# Patient Record
Sex: MALE | Race: WHITE
[De-identification: names, ages, dates, MRNs, and addresses within clinical notes are randomized per-mention and may not be internally consistent; named-entity substitution may affect disease eponyms.]

---

## 2020-12-17 ENCOUNTER — HOSPITAL ENCOUNTER (EMERGENCY)
Dept: HOSPITAL 75 - ER | Age: 40
Discharge: HOME | End: 2020-12-17
Payer: SELF-PAY

## 2020-12-17 VITALS — DIASTOLIC BLOOD PRESSURE: 88 MMHG | SYSTOLIC BLOOD PRESSURE: 122 MMHG

## 2020-12-17 VITALS — HEIGHT: 66.93 IN | WEIGHT: 194.89 LBS | BODY MASS INDEX: 30.59 KG/M2

## 2020-12-17 DIAGNOSIS — R07.89: Primary | ICD-10-CM

## 2020-12-17 DIAGNOSIS — K29.60: ICD-10-CM

## 2020-12-17 DIAGNOSIS — K21.9: ICD-10-CM

## 2020-12-17 DIAGNOSIS — F17.210: ICD-10-CM

## 2020-12-17 DIAGNOSIS — F41.9: ICD-10-CM

## 2020-12-17 LAB
ALBUMIN SERPL-MCNC: 4.8 GM/DL (ref 3.2–4.5)
ALP SERPL-CCNC: 78 U/L (ref 40–136)
ALT SERPL-CCNC: 70 U/L (ref 0–55)
APTT BLD: 29 SEC (ref 24–35)
BASOPHILS # BLD AUTO: 0 10^3/UL (ref 0–0.1)
BASOPHILS NFR BLD AUTO: 0 % (ref 0–10)
BILIRUB SERPL-MCNC: 0.6 MG/DL (ref 0.1–1)
BUN/CREAT SERPL: 18
CALCIUM SERPL-MCNC: 9.3 MG/DL (ref 8.5–10.1)
CHLORIDE SERPL-SCNC: 105 MMOL/L (ref 98–107)
CO2 SERPL-SCNC: 22 MMOL/L (ref 21–32)
CREAT SERPL-MCNC: 0.99 MG/DL (ref 0.6–1.3)
EOSINOPHIL # BLD AUTO: 0.1 10^3/UL (ref 0–0.3)
EOSINOPHIL NFR BLD AUTO: 1 % (ref 0–10)
GFR SERPLBLD BASED ON 1.73 SQ M-ARVRAT: > 60 ML/MIN
GLUCOSE SERPL-MCNC: 99 MG/DL (ref 70–105)
HCT VFR BLD CALC: 46 % (ref 40–54)
HGB BLD-MCNC: 15.3 G/DL (ref 13.3–17.7)
INR PPP: 1 (ref 0.8–1.4)
LIPASE SERPL-CCNC: 33 U/L (ref 8–78)
LYMPHOCYTES # BLD AUTO: 2.2 10^3/UL (ref 1–4)
LYMPHOCYTES NFR BLD AUTO: 25 % (ref 12–44)
MAGNESIUM SERPL-MCNC: 2 MG/DL (ref 1.6–2.4)
MANUAL DIFFERENTIAL PERFORMED BLD QL: NO
MCH RBC QN AUTO: 30 PG (ref 25–34)
MCHC RBC AUTO-ENTMCNC: 34 G/DL (ref 32–36)
MCV RBC AUTO: 89 FL (ref 80–99)
MONOCYTES # BLD AUTO: 0.9 10^3/UL (ref 0–1)
MONOCYTES NFR BLD AUTO: 10 % (ref 0–12)
NEUTROPHILS # BLD AUTO: 5.6 10^3/UL (ref 1.8–7.8)
NEUTROPHILS NFR BLD AUTO: 63 % (ref 42–75)
PLATELET # BLD: 209 10^3/UL (ref 130–400)
PMV BLD AUTO: 9.5 FL (ref 9–12.2)
POTASSIUM SERPL-SCNC: 4 MMOL/L (ref 3.6–5)
PROT SERPL-MCNC: 8 GM/DL (ref 6.4–8.2)
PROTHROMBIN TIME: 13.3 SEC (ref 12.2–14.7)
SODIUM SERPL-SCNC: 137 MMOL/L (ref 135–145)
WBC # BLD AUTO: 8.8 10^3/UL (ref 4.3–11)

## 2020-12-17 PROCEDURE — 93005 ELECTROCARDIOGRAM TRACING: CPT

## 2020-12-17 PROCEDURE — 83735 ASSAY OF MAGNESIUM: CPT

## 2020-12-17 PROCEDURE — 85379 FIBRIN DEGRADATION QUANT: CPT

## 2020-12-17 PROCEDURE — 36415 COLL VENOUS BLD VENIPUNCTURE: CPT

## 2020-12-17 PROCEDURE — 83880 ASSAY OF NATRIURETIC PEPTIDE: CPT

## 2020-12-17 PROCEDURE — 83690 ASSAY OF LIPASE: CPT

## 2020-12-17 PROCEDURE — 85025 COMPLETE CBC W/AUTO DIFF WBC: CPT

## 2020-12-17 PROCEDURE — 93041 RHYTHM ECG TRACING: CPT

## 2020-12-17 PROCEDURE — 80053 COMPREHEN METABOLIC PANEL: CPT

## 2020-12-17 PROCEDURE — 83874 ASSAY OF MYOGLOBIN: CPT

## 2020-12-17 PROCEDURE — 84484 ASSAY OF TROPONIN QUANT: CPT

## 2020-12-17 PROCEDURE — 71045 X-RAY EXAM CHEST 1 VIEW: CPT

## 2020-12-17 PROCEDURE — 85610 PROTHROMBIN TIME: CPT

## 2020-12-17 PROCEDURE — 85730 THROMBOPLASTIN TIME PARTIAL: CPT

## 2020-12-17 RX ADMIN — NITROGLYCERIN PRN MG: 0.4 TABLET SUBLINGUAL at 13:55

## 2020-12-17 RX ADMIN — NITROGLYCERIN PRN MG: 0.4 TABLET SUBLINGUAL at 14:01

## 2020-12-17 NOTE — ED CHEST PAIN
General


Chief Complaint:  Chest Pain


Stated Complaint:  CP,SOB


Nursing Triage Note:  


PATIENT STATES LEFT SIDE CHEST PAIN, STARTED LAST NIGHT, WORSENED TODAY. STATES 


BACK PAIN ALSO


Nursing Sepsis Screen:  No Definite Risk


Source:  patient


Exam Limitations:  no limitations





History of Present Illness


Date Seen by Provider:  Dec 17, 2020


Time Seen by Provider:  13:34


Initial Comments


Patient presents ER by private conveyance from home with chief complaint of 

chest pain.  He denies any shortness of breath fever cough chills nausea 

vomiting diarrhea.  No sick contacts.  He says been having problems with this 

pain for couple weeks.  This particular episode started yesterday evening, he 

was just sitting in a chair working when it started.  It is not worse with 

exertion.  He says as he gets up and walks around it feels better but when he 

sits down in a chair it is worse.  He says the pain radiates through to his back

which he is never had that pain before.  In the middle of his back between his 

shoulder blades.  No trauma.  No history of coronary disease.  Last use of 

methamphetamines was about 7 months ago.  He has used tobacco has a history of 

hypertension and hyperlipidemia and recently was started on statins.  He is 

known to [facility] for primary care.  He does not have a history of 

coronary disease or lung disease.  He was on diclofenac recently but was stopped

at his last appointment a week or 2 ago because he had CKD stage II on labs.  

Patient has no significant family history outside of diabetes.  He denies 

diabetes himself.  The patient complains that the pain in his chest wall makes 

his chest and abdomen twitch rhythmically.





Allergies and Home Medications


Allergies


Coded Allergies:  


     No Known Drug Allergies (Unverified , 20)





Patient Home Medication List


Home Medication List Reviewed:  Yes





Review of Systems


Review of Systems


Constitutional:  No chills, No diaphoresis, No fever, No malaise


EENTM:  No Blurred Vision, No Double Vision


Respiratory:  Denies Cough, Denies Shortness of Air


Cardiovascular:  Chest Pain; Denies Edema, Denies Lightheadedness


Gastrointestinal:  Denies Constipated, Denies Diarrhea


Genitourinary:  Denies Burning, Denies Discharge


Musculoskeletal:  see HPI, back pain; No joint pain


Skin:  No pruritus, No rash


Psychiatric/Neurological:  Denies Headache, Denies Numbness





All Other Systems Reviewed


Negative Unless Noted:  Yes





Past Medical-Social-Family Hx


Patient Social History


Alcohol Use:  Occasionally Uses


Recreational Drug Use:  Yes


Drug of Choice:  meth 2020


Smoking Status:  Current Everyday Smoker


Type Used:  Cigarettes


Recent Foreign Travel:  No


Contact w/Someone Who Travel:  No


Recent Infectious Disease Expo:  No





Past Medical History


Surgeries:  No


Cardiac:  Yes


High Cholesterol, Hypertension


Genitourinary:  Yes


Renal Failure





Physical Exam


Vital Signs





Vital Signs - First Documented








 20





 13:41


 


Temp 37.0


 


Pulse 93


 


Resp 20


 


B/P (MAP) 144/103 (117)


 


Pulse Ox 97


 


O2 Delivery Room Air





Capillary Refill : Less Than 3 Seconds


Height, Weight, BMI


Height: '"


Weight: lbs. oz. kg; 30.00 BMI


Method:


General Appearance:  Anxious, Mild Distress


HEENT:  PERRL/EOMI, Moist Mucous Membranes


Neck:  Full Range of Motion, Normal Inspection


Respiratory:  No Chest Non Tender (Chest wall tender to palpation); Lungs Clear,

Normal Breath Sounds, No Accessory Muscle Use, No Respiratory Distress


Cardiovascular:  Regular Rate, Rhythm, No Edema, No Gallop, No JVD, No Murmur, 

Normal Peripheral Pulses


Gastrointestinal:  Normal Bowel Sounds, No Organomegaly, No Pulsatile Mass, Non 

Tender, Soft


Extremity:  Normal Capillary Refill, Normal Inspection, Normal Range of Motion, 

Non Tender, No Calf Tenderness, No Pedal Edema


Neurologic/Psychiatric:  Alert, Oriented x3, Normal Mood/Affect


Skin:  Normal Color, Warm/Dry





Progress/Results/Core Measures


Results/Orders


Lab Results





Laboratory Tests








Test


 20


13:45 Range/Units


 


 


White Blood Count


 8.8 


 4.3-11.0


10^3/uL


 


Red Blood Count


 5.13 


 4.30-5.52


10^6/uL


 


Hemoglobin 15.3  13.3-17.7  g/dL


 


Hematocrit 46  40-54  %


 


Mean Corpuscular Volume 89  80-99  fL


 


Mean Corpuscular Hemoglobin 30  25-34  pg


 


Mean Corpuscular Hemoglobin


Concent 34 


 32-36  g/dL





 


Red Cell Distribution Width 12.8  10.0-14.5  %


 


Platelet Count


 209 


 130-400


10^3/uL


 


Mean Platelet Volume 9.5  9.0-12.2  fL


 


Immature Granulocyte % (Auto) 1   %


 


Neutrophils (%) (Auto) 63  42-75  %


 


Lymphocytes (%) (Auto) 25  12-44  %


 


Monocytes (%) (Auto) 10  0-12  %


 


Eosinophils (%) (Auto) 1  0-10  %


 


Basophils (%) (Auto) 0  0-10  %


 


Neutrophils # (Auto)


 5.6 


 1.8-7.8


10^3/uL


 


Lymphocytes # (Auto)


 2.2 


 1.0-4.0


10^3/uL


 


Monocytes # (Auto)


 0.9 


 0.0-1.0


10^3/uL


 


Eosinophils # (Auto)


 0.1 


 0.0-0.3


10^3/uL


 


Basophils # (Auto)


 0.0 


 0.0-0.1


10^3/uL


 


Immature Granulocyte # (Auto)


 0.1 


 0.0-0.1


10^3/uL


 


Prothrombin Time 13.3  12.2-14.7  SEC


 


INR Comment 1.0  0.8-1.4  


 


Activated Partial


Thromboplast Time 29 


 24-35  SEC





 


D-Dimer


 < 0.27 


 0.00-0.49


UG/ML


 


Sodium Level 137  135-145  MMOL/L


 


Potassium Level 4.0  3.6-5.0  MMOL/L


 


Chloride Level 105    MMOL/L


 


Carbon Dioxide Level 22  21-32  MMOL/L


 


Anion Gap 10  5-14  MMOL/L


 


Blood Urea Nitrogen 18  7-18  MG/DL


 


Creatinine


 0.99 


 0.60-1.30


MG/DL


 


Estimat Glomerular Filtration


Rate > 60 


  





 


BUN/Creatinine Ratio 18   


 


Glucose Level 99    MG/DL


 


Calcium Level 9.3  8.5-10.1  MG/DL


 


Corrected Calcium   8.5-10.1  MG/DL


 


Magnesium Level 2.0  1.6-2.4  MG/DL


 


Total Bilirubin 0.6  0.1-1.0  MG/DL


 


Aspartate Amino Transf


(AST/SGOT) 38 H


 5-34  U/L





 


Alanine Aminotransferase


(ALT/SGPT) 70 H


 0-55  U/L





 


Alkaline Phosphatase 78    U/L


 


Myoglobin


 44.1 


 10.0-92.0


NG/ML


 


Troponin I < 0.028  <0.028  NG/ML


 


B-Type Natriuretic Peptide < 10.0  <100.0  PG/ML


 


Total Protein 8.0  6.4-8.2  GM/DL


 


Albumin 4.8 H 3.2-4.5  GM/DL


 


Lipase 33  8-78  U/L








My Orders





Orders - MELY DUMONT


Continuous Ekg Monitoring (20 13:15)


Ekg Tracing (20 13:15)


Cbc With Automated Diff (20 13:51)


Magnesium (20 13:51)


Chest 1 View, Ap/Pa Only (20 13:51)


Ekg Tracing (20 13:51)


Comprehensive Metabolic Panel (20 13:51)


Myoglobin Serum (20 13:51)


Protime With Inr (20 13:51)


Partial Thromboplastin Time (20 13:51)


O2 (20 13:51)


Monitor-Rhythm Ecg Trace Only (20 13:51)


Lipid Panel (20 06:00)


Ed Iv/Invasive Line Start (20 13:51)


Lipase (20 13:51)


BNP (20 13:51)


Fibrin Degradation Products (20 13:51)


Troponin I (20 13:51)


Nitroglycerin 0.4 Mg Btl 25's (Nitrostat (20 14:00)


Aspirin Chewable Tablet (Baby Aspirin Ch (20 14:00)


Ed Iv/Invasive Line Start (20 13:51)


Ns Iv 1000 Ml (Sodium Chloride 0.9%) (20 14:00)


Nitroglycerin 0.4 Mg Btl 25's (Nitrostat (20 13:50)


Aspirin Chewable Tablet (Baby Aspirin Ch (20 13:50)


Lidocaine 2% Viscous 15 Ml (Xylocaine Vi (20 14:30)


Antacid  Suspension (Mylanta  Suspension (20 14:30)


Famotidine Injection (Pepcid Injection) (20 14:30)





Medications Given in ED





Current Medications








 Medications  Dose


 Ordered  Sig/Evelio


 Route  Start Time


 Stop Time Status Last Admin


Dose Admin


 


 Al Hydrox/Mg


 Hydrox/Simethicone  30 ml  ONCE  ONCE


 PO  20 14:30


 20 14:32 DC 20 14:49


30 ML


 


 Aspirin  324 mg  ONCE  ONCE


 PO  20 14:00


 20 14:01 DC 20 13:54


324 MG


 


 Lidocaine HCl  15 ml  ONCE  ONCE


 PO  20 14:30


 20 14:32 DC 20 14:49


15 ML


 


 Nitroglycerin  0.4 mg  UD  PRN


 SL  20 14:00


    20 14:01


0.4 MG








Vital Signs/I&O











 20





 13:41 13:43


 


Temp 37.0 


 


Pulse 93 


 


Resp 20 


 


B/P (MAP) 144/103 (117) 


 


Pulse Ox 97 


 


O2 Delivery Room Air Room Air














Blood Pressure Mean:                    117











Progress


Progress Note #1:  


   Time:  13:56


Progress Note


Chest wall pain versus possible referred pain from gallbladder, coronary 

disease, pulmonary disease, less likely aortic disease.  Since his pain radiates

to his back but is reproducible on direct palpation of his back and 

osteoarthritis is more likely.  GERD is also a possibility.  Plan to start with 

aspirin nitroglycerin and some fluids.  If this does not help we can trial a GI 

cocktail.  Will get troponin, D-dimer and chest x-ray.





If he has a negative troponin for this episode of pain this been going on for 

over 12 hours then his heart score would be 3 points. Low risk; 0.9-1.7% 30-day 

MACE. 





After 2 doses of nitroglycerin his pain went from a 7 out of 10 to a 5 out of 

10.


Progress Note #2:  


   Time:  14:29


Progress Note


Patient states his pains a back up to a 6 or 7.  He is unconvinced the ni

troglycerin helped.  We will try a GI cocktail.  He says he is no longer having 

anterior chest pain just under the left wrist and the left mid axillary region. 

No rash.  Were going to try GI cocktail.  Initial troponin is negative.  Again 

he complains that his pain is causing twitching.  Musculoskeletal remained to 

the top of the differential.


Progress Note #3:  


   Time:  15:07


Progress Note


Patient states that his pain was significantly reduced to 3 out of 10 after the 

GI cocktail.  It is very likely that the diclofenac caused some gastritis.  He 

probably does have musculoskeletal pain.  He describes an overnight stay in 

[place] with a stress test that was negative about a year ago.  Were going to 

have him follow-up with cardiology in the clinic and if they give clearance he 

can then follow-up with Dr. Welch, general surgery to consider endoscopy.  Were

going to put him on Carafate and omeprazole.


Initial ECG Impression Date:  Dec 17, 2020


Initial ECG Impression Time:  13:35


Initial ECG Rate:  95


Initial ECG Rhythm:  Normal Sinus


Initial ECG Intervals:  Normal


Initial ECG Impression:  Normal


Initial ECG Comparisson:  No Previous ECG Available


Comment


Normal sinus rhythm without clinically relevant ST changes.





Diagnostic Imaging





   Diagonstic Imaging:  Xray


   Plain Films/CT/US/NM/MRI:  chest


Comments


No acute cardiopulmonary processes on a 1 view chest x-ray.


NAME:   MARYCRUZ SO


MED REC#:   [id number]


ACCOUNT#:   [id number]


PT STATUS:   REG ER


:   1980


PHYSICIAN:   MELY DUMONT MD


ADMIT DATE:   20/ER


                                   ***Draft***


Date of Exam:20





CHEST 1 VIEW, AP/PA ONLY








INDICATION: Left-sided chest pain.





FINDINGS: Portable chest shows the lungs to be well-aerated and


clear. There is no air-trapping. Heart is not enlarged. There is


no pulmonary edema or hilar adenopathy. No pneumothorax or


pleural effusion. No bony abnormalities.





IMPRESSION: Normal portable chest.





  Dictated on workstation # [id number]








Dict:   20 1425


Trans:   20 1426


[facility] [id number]





Interpreted by:     AGUSTO PARDO MD


Electronically signed by:


   Reviewed:  Reviewed by Me





Departure


Impression





   Primary Impression:  


   Chest wall pain


   Additional Impressions:  


   Gastroesophageal reflux disease


   Qualified Codes:  K21.0 - Gastro-esophageal reflux disease with esophagitis


   NSAID induced gastritis


Disposition:  01 HOME, SELF-CARE


Condition:  Stable





Departure-Patient Inst.


Decision time for Depature:  15:09


Referrals:  


[facility] (PCP/Family)


Primary Care Physician








AGUSTO WELCH BASHAR J MD


Patient Instructions:  Costochondritis, Gastritis ED





Add. Discharge Instructions:  


I suspect your chest pain is caused by the chest wall and not the heart.  To 

help you work this up Dr. Mendoza would be willing to meet you in the clinic if 

you will give him a call.


Continue taking your lisinopril and atorvastatin as prescribed.


Stop taking the diclofenac, ibuprofen, Aleve, naproxen.  

Tylenol/paracetamol/acetaminophen is okay.


Start taking omeprazole 40 mg daily for the next month.


Start taking the Carafate 30 minutes prior to meals and at bedtime for the next 

2 weeks to help treat your gastritis.


I think your gastritis or stomach irritation is caused by the diclofenac.  


If the cardiologist releases you from his care with no further concerns then you

may also follow-up with Dr. Welch, general surgery.  He can help you manage 

your symptoms if they are related to your stomach, gallbladder etc.


Return to the nearest ER if you have more intractable pain that does not respond

to Maalox/Mylanta/Tums/Rolaids etc.


All discharge instructions reviewed with patient and/or family. Voiced 

understanding.


Scripts


Sucralfate (Carafate) 1 Gm Tablet


1 GM PO QIDACHS for 14 Days, #56 TAB 0 Refills


   Prov: MELY DUMONT         20 


Omeprazole (Omeprazole) 40 Mg Capsule.dr


40 MG PO DAILY for 30 Days, #30 CAP 0 Refills


   Prov: MELY DUMONT         20





Copy


Copies To 1:   AGUSTO WELCH DO; FLOR MENDOZA MD, TITUS J                 Dec 17, 2020 13:57

## 2020-12-17 NOTE — DIAGNOSTIC IMAGING REPORT
INDICATION: Left-sided chest pain.



FINDINGS: Portable chest shows the lungs to be well-aerated and

clear. There is no air-trapping. Heart is not enlarged. There is

no pulmonary edema or hilar adenopathy. No pneumothorax or

pleural effusion. No bony abnormalities.



IMPRESSION: Normal portable chest.



Dictated by: 



  Dictated on workstation # [id number]

## 2021-10-25 ENCOUNTER — HOSPITAL ENCOUNTER (OUTPATIENT)
Dept: HOSPITAL 75 - PREOP | Age: 41
LOS: 7 days | Discharge: HOME | End: 2021-11-01
Attending: SURGERY
Payer: SELF-PAY

## 2021-10-25 DIAGNOSIS — Z01.818: Primary | ICD-10-CM

## 2021-11-01 ENCOUNTER — HOSPITAL ENCOUNTER (OUTPATIENT)
Dept: HOSPITAL 75 - ENDO | Age: 41
Discharge: HOME | End: 2021-11-01
Attending: SURGERY
Payer: COMMERCIAL

## 2021-11-01 VITALS — HEIGHT: 67.99 IN | WEIGHT: 208.12 LBS | BODY MASS INDEX: 31.54 KG/M2

## 2021-11-01 VITALS — DIASTOLIC BLOOD PRESSURE: 50 MMHG | SYSTOLIC BLOOD PRESSURE: 90 MMHG

## 2021-11-01 VITALS — SYSTOLIC BLOOD PRESSURE: 89 MMHG | DIASTOLIC BLOOD PRESSURE: 55 MMHG

## 2021-11-01 VITALS — SYSTOLIC BLOOD PRESSURE: 123 MMHG | DIASTOLIC BLOOD PRESSURE: 81 MMHG

## 2021-11-01 VITALS — DIASTOLIC BLOOD PRESSURE: 78 MMHG | SYSTOLIC BLOOD PRESSURE: 121 MMHG

## 2021-11-01 DIAGNOSIS — Z79.899: ICD-10-CM

## 2021-11-01 DIAGNOSIS — K29.50: Primary | ICD-10-CM

## 2021-11-01 DIAGNOSIS — I10: ICD-10-CM

## 2021-11-01 DIAGNOSIS — K21.00: ICD-10-CM

## 2021-11-01 DIAGNOSIS — Z86.19: ICD-10-CM

## 2021-11-01 DIAGNOSIS — E66.9: ICD-10-CM

## 2021-11-01 DIAGNOSIS — K44.9: ICD-10-CM

## 2021-11-01 DIAGNOSIS — E78.5: ICD-10-CM

## 2021-11-01 DIAGNOSIS — E78.00: ICD-10-CM

## 2021-11-01 DIAGNOSIS — Z20.822: ICD-10-CM

## 2021-11-01 PROCEDURE — 87636 SARSCOV2 & INF A&B AMP PRB: CPT

## 2021-11-01 NOTE — ANESTHESIA-GENERAL POST-OP
MAC


Patient Condition


Mental Status/LOC:  Same as Preop


Cardiovascular:  Satisfactory


Nausea/Vomiting:  Absent


Respiratory:  Satisfactory


Pain:  Controlled


Complications:  Absent





Post Op Complications


Complications


None





Follow Up Care/Instructions


Patient Instructions


None needed.





Anesthesiology Discharge Order


Discharge Order


Patient is doing well, no complaints, stable vital signs, no apparent adverse 

anesthesia problems.   


No complications reported per nursing.











EMILY MARTINEZ CRNA             Nov 1, 2021 12:25

## 2021-11-01 NOTE — ENDOSCOPY DISCHARGE INSTRUCT
Endo Procedure/Findings


Findings


1.:  Gastritis


2.:  Hiatal Hernia





Discharge Instructions


-


Activity: You might feel a little sleepy until tomorrow.  This is due to the 

medicine you received to relax you.





Until tomorrow, you should:  


   NOT drive a car, operate machinery or power tools.


   NOT drink any alcoholic beverages.


   NOT make any important decisions or sign importortant papers.





Do not return to work until tomorrow, unless otherwise instructed. Resume 

previous activities tomorrow.





Diet: Start by taking liquids.  If you tolerate liquids, advance to solid food.


1.:  EGD in 3 years





Notify Physician


-


If you experience excessive bleeding, unusual abdominal pain, fever, or chest 

pain, contact your doctor immediately.











AGUSTO WELCH DO                Nov 1, 2021 11:37
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
## 2021-11-01 NOTE — PROGRESS NOTE-POST OPERATIVE
Post-Operative Progess Note


Surgeon (s)/Assistant (s)


Surgeon


AGUSTO WELCH DO


Assistant:  none





Pre-Operative Diagnosis


Chronic gastritis, hx of H. Pylori





Post-Operative Diagnosis





Gastritis


Small hiatal hernia





Procedure & Operative Findings


Date of Procedure


11/1/21


Procedure Performed/Findings


EGD with bx





PROCEDURE NOTE:


After informed consent was obtained, the patient was brought to the endoscopy


suite, placed in bed in left lateral decubitus position.  He was administered


IV sedation by the CRNA who then monitored  vitals the entire time, heart


rate, blood pressure and pulse ox and the scope was inserted down the mouth


through the esophagus into the stomach.  On the way down, noted some mild


esophagitis, took a picture, pushed into the stomach, pushed past the antrum


into the duodenum.  Duodenum looked good.  Pulled back and did a biopsy of


antrum, then retroflexed the scope, saw small sliding hiatal hernia, took a 

picture 


of this and then pulled the scope into the GE junction, took another picture 


and then did a biopsy of the GE junction.  Pushed the scope back into the 


stomach, suctioned all the air out of the stomach. At this point pulled the 


scope up the esophagus and out the mouth. 





The patient tolerated the procedure, and he recovered in endoscopy suite.


Anesthesia Type


IV sedation by CRNA





Estimated Blood Loss


Estimated blood loss (mL):  scant





Specimens/Packing


Specimens Removed


Antral bx


body of stomach bx


GE jxn bx











AGUSTO WELCH DO                Nov 1, 2021 11:36

## 2021-11-01 NOTE — PROGRESS NOTE-PRE OPERATIVE
Pre-Operative Progress Note


H&P Reviewed


The H&P was reviewed, patient examined and no changes noted.


Time Seen by Provider:  10:45


Date H&P Reviewed:  Nov 1, 2021


Time H&P Reviewed:  10:44


Pre-Operative Diagnosis:  Chronic gastritis, hx of H. Pylori











AGUSTO WELCH DO                Nov 1, 2021 10:47

## 2022-01-03 ENCOUNTER — HOSPITAL ENCOUNTER (OUTPATIENT)
Dept: HOSPITAL 75 - RAD | Age: 42
End: 2022-01-03
Attending: PHYSICIAN ASSISTANT
Payer: COMMERCIAL

## 2022-01-03 DIAGNOSIS — R51.9: Primary | ICD-10-CM

## 2022-01-03 PROCEDURE — 70450 CT HEAD/BRAIN W/O DYE: CPT

## 2022-01-03 NOTE — DIAGNOSTIC IMAGING REPORT
PROCEDURE: 

CT head without contrast.



TECHNIQUE: 

Multiple contiguous axial images were obtained through the brain

without the use of intravenous contrast. Auto Exposure Controls

were utilized during the CT exam to meet ALARA standards for

radiation dose reduction. 



INDICATION:  

Increasing headache.



COMPARISON: 

No prior studies are available for comparison.



FINDINGS:

The ventricles and sulci are within normal limits. No sulcal

effacement or midline shift is identified. No acute intra-axial

or extra-axial hemorrhage is detected. The cisterns are patent.

The visualized paranasal sinuses are clear.



IMPRESSION: 

No acute intracranial process is detected.



Dictated by: 



  Dictated on workstation # [id number]